# Patient Record
(demographics unavailable — no encounter records)

---

## 2025-06-09 NOTE — PHYSICAL EXAM
76 yo F with PMH of DM2, RA, LLE DVT on Eliquis, CKD 3, gout, latent TB on Rifampin and foot ulcer admitted for fever, likely 2/2 UTI. I week PTP patient underwent an uneventful debridement by podiatry, 1 day post-op was complaining of RLE pain, was advised by podiatry office to take pain relievers until her follow up appt. She was then referred to ED by visiting nurse after noting a fever    1. Coagulase negative Staph bacteremia  -possibly contamination  - UA+, ucx normal urogenital reva  - blood Cx + x2 --->1st is Coag negative staph, 2nd gm + cocci in clusters  -awaiting repeat cultures  -ID recomendations appreciated, no change to current abx  -check 2d echo  -f/u podiatry for woundcare  -can check CBC, BMP, Mg on monday    2. Choking episode  -no further occurrences  -speech and swallow eval appreciated  -if patient has a choking episode again, will ask for GI eval     3. Troponemia  no active chest pain  stable at 0.06, no need to check anymore    4. RA - follows Dr Henry  patient is on Prednisone 15 daily, leucovorin/Folic Acid  as per patient, she was supposed to start Rituxan infusions this week   Rheumatology evaluation pending    5. LE pitting edema  improved since patent has been sitting up in the chair  2d echo from 6/2018 shows normal systolic fxn, mild KS, mild TR  cxray shows no effusions/no pulm vascular congestion  possibly due to hypoalbuminemia/sedentary lifestyle    6. Latent TB - c/w Rifampin (total 4 month treatment)    7. LLE DVT - c/w Eliquis 5mg BID    8. DM - holding oral agents, monitor fs, insulin PRN    9. CKD 3 - stable    DVT ppx: on Eliquis  Diet: DASH, carb consistent  Activity: OOB  Code status: FULL  Dispo: patient requesting to go to [facility] for rehab. Awaiting PT eval    Case discussed with patient and housestaff [Chaperoned Physical Exam] : A chaperone was present in the examining room during all aspects of the physical examination. [MA] : MA [Alert] : alert [Appropriately responsive] : appropriately responsive [No Acute Distress] : no acute distress [Soft] : soft [No Lymphadenopathy] : no lymphadenopathy [Non-tender] : non-tender [Non-distended] : non-distended [Oriented x3] : oriented x3 [Examination Of The Breasts] : a normal appearance [No Masses] : no breast masses were palpable [Labia Majora] : normal [Labia Minora] : normal [Discharge] : a  ~M vaginal discharge was present [Normal] : normal [Uterine Adnexae] : normal [FreeTextEntry3] : mobile thyroid, no masses, no nodules  [FreeTextEntry6] : No cervical or axillary lymphadenopathy.

## 2025-06-09 NOTE — HISTORY OF PRESENT ILLNESS
[FreeTextEntry1] :  HPI:   Patient presents for gyn annual   LMP: , neg PMB   Last pap: 2023 neg cytology, neg hpv  Last MM neg  Last colonoscopy:  neg per patient    Sexually active: yes    PMH: denies  PSH: , hysteroscopy  ALL: nkda  Sochx:  denies etoh, illicit, tobacco OBHx:  x 2      FAMHX:  denies breast,  uterine cancer Breast cancer: no  Colon cancer: sister (Migdalia)  Uterine cancer: no Ovarian cancer: ?sister (Migdalia) (living) (?colon tara?)   ---------------------------------------------------------------------------------------------------------   ASSESSMENT & PLAN:    51 y/o   #gyn annual   -ASCCP guidelines reviewed; pap, hpv  -serum STI screen offered: declines  -encourage pcp/gyn/dermatology care annually -MMG/US; RN task   #vaginal discharge -vaginitis and gc/c culture    rto 1 yr gyn annual or prn   Dr. Tati Blas DO, MPH, FACOG